# Patient Record
Sex: FEMALE | URBAN - METROPOLITAN AREA
[De-identification: names, ages, dates, MRNs, and addresses within clinical notes are randomized per-mention and may not be internally consistent; named-entity substitution may affect disease eponyms.]

---

## 2018-11-08 ENCOUNTER — APPOINTMENT (RX ONLY)
Dept: URBAN - METROPOLITAN AREA CLINIC 122 | Facility: CLINIC | Age: 53
Setting detail: DERMATOLOGY
End: 2018-11-08

## 2018-11-08 ENCOUNTER — RX ONLY (OUTPATIENT)
Age: 53
Setting detail: RX ONLY
End: 2018-11-08

## 2018-11-08 DIAGNOSIS — T07XXXA INSECT BITE, NONVENOMOUS, OF OTHER, MULTIPLE, AND UNSPECIFIED SITES, WITHOUT MENTION OF INFECTION: ICD-10-CM

## 2018-11-08 DIAGNOSIS — L20.89 OTHER ATOPIC DERMATITIS: ICD-10-CM

## 2018-11-08 DIAGNOSIS — L74.51 PRIMARY FOCAL HYPERHIDROSIS: ICD-10-CM

## 2018-11-08 PROBLEM — T07.XXXA UNSPECIFIED MULTIPLE INJURIES, INITIAL ENCOUNTER: Status: ACTIVE | Noted: 2018-11-08

## 2018-11-08 PROBLEM — L20.84 INTRINSIC (ALLERGIC) ECZEMA: Status: ACTIVE | Noted: 2018-11-08

## 2018-11-08 PROBLEM — L23.7 ALLERGIC CONTACT DERMATITIS DUE TO PLANTS, EXCEPT FOOD: Status: ACTIVE | Noted: 2018-11-08

## 2018-11-08 PROBLEM — L74.519 PRIMARY FOCAL HYPERHIDROSIS, UNSPECIFIED: Status: ACTIVE | Noted: 2018-11-08

## 2018-11-08 PROBLEM — J30.1 ALLERGIC RHINITIS DUE TO POLLEN: Status: ACTIVE | Noted: 2018-11-08

## 2018-11-08 PROCEDURE — 99203 OFFICE O/P NEW LOW 30 MIN: CPT

## 2018-11-08 PROCEDURE — ? TREATMENT REGIMEN

## 2018-11-08 PROCEDURE — ? PRESCRIPTION

## 2018-11-08 RX ORDER — TRIAMCINOLONE ACETONIDE 1 MG/G
SMALL CREAM TOPICAL BID
Qty: 80 | Refills: 3 | Status: ERX | COMMUNITY
Start: 2018-11-08

## 2018-11-08 RX ORDER — IVERMECTIN 3 MG/1
TAKE FOUR AT ONCE TABLET ORAL
Qty: 8 | Refills: 0 | Status: ERX | COMMUNITY
Start: 2018-11-08

## 2018-11-08 RX ORDER — GLYCOPYRROLATE 1 MG/1
ONE TABLET ORAL TID
Qty: 90 | Refills: 6 | Status: ERX | COMMUNITY
Start: 2018-11-08

## 2018-11-08 RX ADMIN — TRIAMCINOLONE ACETONIDE SMALL: 1 CREAM TOPICAL at 16:55

## 2018-11-08 RX ADMIN — IVERMECTIN TAKE FOUR AT ONCE: 3 TABLET ORAL at 16:56

## 2018-11-08 RX ADMIN — GLYCOPYRROLATE ONE: 1 TABLET ORAL at 16:54

## 2018-11-08 NOTE — PROCEDURE: MIPS QUALITY
Quality 130: Documentation Of Current Medications In The Medical Record: Current Medications Documented
Quality 131: Pain Assessment And Follow-Up: Pain assessment using a standardized tool is documented as negative, no follow-up plan required
Quality 110: Preventive Care And Screening: Influenza Immunization: Influenza immunization was not ordered or administered, reason not given
Detail Level: Detailed

## 2018-11-08 NOTE — HPI: BODY LOCATION - FACE
How Severe Is Your Condition?: severe
Additional History: Patient also has excessive sweating on bilateral feet

## 2018-11-08 NOTE — PROCEDURE: TREATMENT REGIMEN
Plan: ADVISED NOT AWARE THAT EAR MITES ON KITTEN CAN AFFECT SKIN,MAY BE GETTING OTHER BITES
Detail Level: Zone

## 2018-12-04 ENCOUNTER — APPOINTMENT (RX ONLY)
Dept: URBAN - METROPOLITAN AREA CLINIC 45 | Facility: CLINIC | Age: 53
Setting detail: DERMATOLOGY
End: 2018-12-04

## 2018-12-04 DIAGNOSIS — L20.89 OTHER ATOPIC DERMATITIS: ICD-10-CM

## 2018-12-04 PROBLEM — L20.84 INTRINSIC (ALLERGIC) ECZEMA: Status: ACTIVE | Noted: 2018-12-04

## 2018-12-04 PROCEDURE — ? COUNSELING

## 2018-12-04 PROCEDURE — ? PRESCRIPTION

## 2018-12-04 PROCEDURE — ? TREATMENT REGIMEN

## 2018-12-04 PROCEDURE — 99213 OFFICE O/P EST LOW 20 MIN: CPT

## 2018-12-04 RX ORDER — HYDROXYZINE HYDROCHLORIDE 25 MG/1
TABLET, FILM COATED ORAL
Qty: 10 | Refills: 0 | Status: ERX | COMMUNITY
Start: 2018-12-04

## 2018-12-04 RX ORDER — TRIAMCINOLONE ACETONIDE 1 MG/G
CREAM TOPICAL BID
Qty: 2 | Refills: 3 | Status: ERX | COMMUNITY
Start: 2018-12-04

## 2018-12-04 RX ADMIN — TRIAMCINOLONE ACETONIDE: 1 CREAM TOPICAL at 18:12

## 2018-12-04 RX ADMIN — HYDROXYZINE HYDROCHLORIDE: 25 TABLET, FILM COATED ORAL at 18:14

## 2018-12-04 ASSESSMENT — LOCATION ZONE DERM: LOCATION ZONE: TRUNK

## 2018-12-04 ASSESSMENT — LOCATION SIMPLE DESCRIPTION DERM
LOCATION SIMPLE: CHEST
LOCATION SIMPLE: LEFT UPPER BACK

## 2018-12-04 ASSESSMENT — LOCATION DETAILED DESCRIPTION DERM
LOCATION DETAILED: MIDDLE STERNUM
LOCATION DETAILED: LEFT MID-UPPER BACK

## 2018-12-04 NOTE — HPI: RASH
How Severe Is Your Rash?: moderate
Is This A New Presentation, Or A Follow-Up?: Rash
Additional History: Saw  few weeks for eczema and was given Triamcinolone. \\n\\nPatient feels like there is bugs under her skin. Patient bathes in hot water with bleach and rubs rubbing alcohol daily

## 2018-12-04 NOTE — PROCEDURE: MIPS QUALITY
Quality 110: Preventive Care And Screening: Influenza Immunization: Influenza Immunization Ordered or Recommended, but not Administered due to system reason
Detail Level: Detailed
Additional Notes: Don’t do flu shots

## 2018-12-04 NOTE — PROCEDURE: TREATMENT REGIMEN
Show Topical Antibiotics Line: Yes
Action 2: Continue
Continue Regimen: Bleach baths but limited to twice a week and to use 1/2 cup of bleach to a full bath
Detail Level: Zone
Start Regimen: Back on the Triamcinolone cream \\nHydroxyzine 25mg QD \\nZyrtec QD
Other Instructions: Cut nail short to avoid scratching \\nAvoid hot showers \\nSarna lotion daily \\nAvoid applying alcohol